# Patient Record
Sex: FEMALE | Race: WHITE
[De-identification: names, ages, dates, MRNs, and addresses within clinical notes are randomized per-mention and may not be internally consistent; named-entity substitution may affect disease eponyms.]

---

## 2022-05-16 ENCOUNTER — HOSPITAL ENCOUNTER (INPATIENT)
Dept: HOSPITAL 95 - SURS | Age: 67
LOS: 3 days | Discharge: HOME | DRG: 871 | End: 2022-05-19
Attending: HOSPITALIST | Admitting: HOSPITALIST
Payer: MEDICARE

## 2022-05-16 VITALS — HEIGHT: 61 IN | BODY MASS INDEX: 29.22 KG/M2 | WEIGHT: 154.76 LBS

## 2022-05-16 DIAGNOSIS — A41.9: Primary | ICD-10-CM

## 2022-05-16 DIAGNOSIS — Z87.891: ICD-10-CM

## 2022-05-16 DIAGNOSIS — J69.0: ICD-10-CM

## 2022-05-16 DIAGNOSIS — F15.10: ICD-10-CM

## 2022-05-16 DIAGNOSIS — I48.0: ICD-10-CM

## 2022-05-16 DIAGNOSIS — J96.01: ICD-10-CM

## 2022-05-16 DIAGNOSIS — Z90.49: ICD-10-CM

## 2022-05-16 DIAGNOSIS — J18.9: ICD-10-CM

## 2022-05-16 DIAGNOSIS — R65.20: ICD-10-CM

## 2022-05-16 DIAGNOSIS — E87.6: ICD-10-CM

## 2022-05-16 DIAGNOSIS — K56.609: ICD-10-CM

## 2022-05-16 LAB
ANION GAP SERPL CALCULATED.4IONS-SCNC: 8 MMOL/L (ref 6–16)
BUN SERPL-MCNC: 18 MG/DL (ref 8–24)
CALCIUM SERPL-MCNC: 8.8 MG/DL (ref 8.5–10.1)
CHLORIDE SERPL-SCNC: 105 MMOL/L (ref 98–108)
CO2 SERPL-SCNC: 28 MMOL/L (ref 21–32)
CREAT SERPL-MCNC: 0.48 MG/DL (ref 0.4–1)
GLUCOSE SERPL-MCNC: 95 MG/DL (ref 70–99)
MAGNESIUM SERPL-MCNC: 2.2 MG/DL (ref 1.6–2.4)
POTASSIUM SERPL-SCNC: 3.4 MMOL/L (ref 3.5–5.5)
SODIUM SERPL-SCNC: 141 MMOL/L (ref 136–145)

## 2022-05-17 LAB
ANION GAP SERPL CALCULATED.4IONS-SCNC: 9 MMOL/L (ref 6–16)
BUN SERPL-MCNC: 18 MG/DL (ref 8–24)
CALCIUM SERPL-MCNC: 8.2 MG/DL (ref 8.5–10.1)
CHLORIDE SERPL-SCNC: 106 MMOL/L (ref 98–108)
CO2 SERPL-SCNC: 29 MMOL/L (ref 21–32)
CREAT SERPL-MCNC: 0.5 MG/DL (ref 0.4–1)
DEPRECATED RDW RBC AUTO: 50.6 FL (ref 35.1–46.3)
ERYTHROCYTE [DISTWIDTH] IN BLOOD BY AUTOMATED COUNT: 15.6 % (ref 11.7–14.2)
GLUCOSE SERPL-MCNC: 92 MG/DL (ref 70–99)
HCT VFR BLD AUTO: 39.8 % (ref 33–51)
HGB BLD-MCNC: 13.2 G/DL (ref 11.5–16)
MCHC RBC AUTO-ENTMCNC: 33.2 G/DL (ref 31.5–36.5)
MCV RBC AUTO: 88 FL (ref 80–100)
NRBC # BLD AUTO: 0 K/MM3 (ref 0–0.02)
NRBC BLD AUTO-RTO: 0 /100 WBC (ref 0–0.2)
PLATELET # BLD AUTO: 167 K/MM3 (ref 150–400)
POTASSIUM SERPL-SCNC: 3.1 MMOL/L (ref 3.5–5.5)
SODIUM SERPL-SCNC: 144 MMOL/L (ref 136–145)

## 2022-05-17 NOTE — NUR
SHIFT SUMMARY
PT TO HAVE SMALL BOWEL FOLLOW-THROUGH IN IMAGING TODAY. PT INITIALLY PASSING
FLATUS AND WAS THEN ABLE TO HAVE A BM W/ 2200ML LIQUID AS WELL AS A FORMED,
DENSE STOOL. SINCE, PT HAS HAD MULTIPLE LIQUID STOOLS. PT TOOK A SHOWER TODAY
AND HAS BEEN ADVANCED TO SIPS OF CLEAR LIQUIDS W/ PERMISSION FOR POPSICLES AND
HAS TOLERATED THE DIET WELL. AT APPROX 1715, PT EXPERIENCED A RUN OF VTACH FOR
8 BEATS. FOR FURTHER, SEE TELEMETRY REPORT. PT IS RESTING IN HER ROOM AT TIME
TIME. WILL CONTINUE TO MONITOR AND UPDATE AS NECESSARY.

## 2022-05-17 NOTE — NUR
NG TUBE DCed
PT'S NG TUBE WAS DCED. NO DISCOMFORT ON DC AND TUBE WAS FOUND TO BE INTACT. NO
GAGGING OR VOMITING UPON REMOVAL.

## 2022-05-17 NOTE — NUR
SHIFT SUMMARY
A/O X4. SINCE ARRIVAL PATIENT HAS REMAINED ON 7L HIGH FLOW 02 W/ NC. NG TUBE
IN PLACE TO LIS DRAINING GREENISH BROWN FLUID, 600ML OUT THIS SHIFT. NAVAS
DRAINING TO GRAVITY. PT REPORTS NO PAIN THROUGHOUT SHIFT. VITAL SIGNS STABLE.
TELE IN PLACE. WILL CONTINUE TO MONITOR AND REPORT TO ONCOMING RN.

## 2022-05-18 LAB
ALBUMIN SERPL BCP-MCNC: 2.5 G/DL (ref 3.4–5)
ALBUMIN/GLOB SERPL: 0.7 {RATIO} (ref 0.8–1.8)
ALT SERPL W P-5'-P-CCNC: 47 U/L (ref 12–78)
ANION GAP SERPL CALCULATED.4IONS-SCNC: 6 MMOL/L (ref 6–16)
AST SERPL W P-5'-P-CCNC: 59 U/L (ref 12–37)
BASOPHILS # BLD AUTO: 0.07 K/MM3 (ref 0–0.23)
BASOPHILS NFR BLD AUTO: 1 % (ref 0–2)
BILIRUB SERPL-MCNC: 0.8 MG/DL (ref 0.1–1)
BUN SERPL-MCNC: 15 MG/DL (ref 8–24)
CALCIUM SERPL-MCNC: 8.4 MG/DL (ref 8.5–10.1)
CHLORIDE SERPL-SCNC: 112 MMOL/L (ref 98–108)
CO2 SERPL-SCNC: 24 MMOL/L (ref 21–32)
CREAT SERPL-MCNC: 0.45 MG/DL (ref 0.4–1)
DEPRECATED RDW RBC AUTO: 51.8 FL (ref 35.1–46.3)
EOSINOPHIL # BLD AUTO: 0.43 K/MM3 (ref 0–0.68)
EOSINOPHIL NFR BLD AUTO: 3 % (ref 0–6)
ERYTHROCYTE [DISTWIDTH] IN BLOOD BY AUTOMATED COUNT: 15.6 % (ref 11.7–14.2)
GLOBULIN SER CALC-MCNC: 3.5 G/DL (ref 2.2–4)
GLUCOSE SERPL-MCNC: 103 MG/DL (ref 70–99)
HCT VFR BLD AUTO: 39.7 % (ref 33–51)
HGB BLD-MCNC: 13 G/DL (ref 11.5–16)
IMM GRANULOCYTES # BLD AUTO: 0.13 K/MM3 (ref 0–0.1)
IMM GRANULOCYTES NFR BLD AUTO: 1 % (ref 0–1)
LYMPHOCYTES # BLD AUTO: 2.36 K/MM3 (ref 0.84–5.2)
LYMPHOCYTES NFR BLD AUTO: 19 % (ref 21–46)
MCHC RBC AUTO-ENTMCNC: 32.7 G/DL (ref 31.5–36.5)
MCV RBC AUTO: 90 FL (ref 80–100)
MONOCYTES # BLD AUTO: 0.92 K/MM3 (ref 0.16–1.47)
MONOCYTES NFR BLD AUTO: 7 % (ref 4–13)
NEUTROPHILS # BLD AUTO: 8.62 K/MM3 (ref 1.96–9.15)
NEUTROPHILS NFR BLD AUTO: 69 % (ref 41–73)
NRBC # BLD AUTO: 0 K/MM3 (ref 0–0.02)
NRBC BLD AUTO-RTO: 0 /100 WBC (ref 0–0.2)
PLATELET # BLD AUTO: 266 K/MM3 (ref 150–400)
POTASSIUM SERPL-SCNC: 3.2 MMOL/L (ref 3.5–5.5)
PROT SERPL-MCNC: 6 G/DL (ref 6.4–8.2)
SODIUM SERPL-SCNC: 142 MMOL/L (ref 136–145)

## 2022-05-18 NOTE — NUR
PT HAD MULTIPLE RUST COLORED LIQ STOOLS. PT DENIED PAIN/N/V. ABD SOFT TO PALP,
ANASTACIO CL PO. SATS >90% ON 7L HFNC. PT REMAINS SOB W/EXERTION, LUNGS W/CRACKLES
IN BASES.  I/S USE REINFORCED. PT UP TO BSC INDEP. NAVAS DRNG BLADIMIR URINE.

## 2022-05-18 NOTE — NUR
AT APPROX 2100 WAS ALERTED BY [employer] THAT PT HAD A RUN OF 7 T-TACH. PT IS
A&O AND VS ARE STABLE.

## 2022-05-18 NOTE — NUR
SHIFT SUMMARY:
SBO
NO SIGNIFICANT CHANGES. PATIENT IS A&OX4. PATIENT IS NOW ON 2L NC ON OXYGEN
WITH >90% OF OXYGEN. PATIENT DENIES PAIN THROUGHOUT SHIFT. SHE IS INDEP. IN
THE ROOM TO BATHROOM. SHE IS VOIDING AND HAVING BMS. SHE IS TOLERATING HER
FULL LIQUID TRAY AND WILL BE TRYING HER REGULAR TRAY FOR DINNER.
CALLS APPROPRIATELY. CALL LIGHT WITHIN REACH.

## 2022-05-19 LAB
ANION GAP SERPL CALCULATED.4IONS-SCNC: 8 MMOL/L (ref 6–16)
BASOPHILS # BLD AUTO: 0.06 K/MM3 (ref 0–0.23)
BASOPHILS NFR BLD AUTO: 1 % (ref 0–2)
BUN SERPL-MCNC: 8 MG/DL (ref 8–24)
CALCIUM SERPL-MCNC: 8.3 MG/DL (ref 8.5–10.1)
CHLORIDE SERPL-SCNC: 108 MMOL/L (ref 98–108)
CO2 SERPL-SCNC: 25 MMOL/L (ref 21–32)
CREAT SERPL-MCNC: 0.46 MG/DL (ref 0.4–1)
DEPRECATED RDW RBC AUTO: 50.3 FL (ref 35.1–46.3)
EOSINOPHIL # BLD AUTO: 0.38 K/MM3 (ref 0–0.68)
EOSINOPHIL NFR BLD AUTO: 3 % (ref 0–6)
ERYTHROCYTE [DISTWIDTH] IN BLOOD BY AUTOMATED COUNT: 15.4 % (ref 11.7–14.2)
GLUCOSE SERPL-MCNC: 105 MG/DL (ref 70–99)
HCT VFR BLD AUTO: 36.3 % (ref 33–51)
HGB BLD-MCNC: 12.1 G/DL (ref 11.5–16)
IMM GRANULOCYTES # BLD AUTO: 0.14 K/MM3 (ref 0–0.1)
IMM GRANULOCYTES NFR BLD AUTO: 1 % (ref 0–1)
LYMPHOCYTES # BLD AUTO: 2.25 K/MM3 (ref 0.84–5.2)
LYMPHOCYTES NFR BLD AUTO: 20 % (ref 21–46)
MAGNESIUM SERPL-MCNC: 1.7 MG/DL (ref 1.6–2.4)
MCHC RBC AUTO-ENTMCNC: 33.3 G/DL (ref 31.5–36.5)
MCV RBC AUTO: 88 FL (ref 80–100)
MONOCYTES # BLD AUTO: 1.02 K/MM3 (ref 0.16–1.47)
MONOCYTES NFR BLD AUTO: 9 % (ref 4–13)
NEUTROPHILS # BLD AUTO: 7.53 K/MM3 (ref 1.96–9.15)
NEUTROPHILS NFR BLD AUTO: 66 % (ref 41–73)
NRBC # BLD AUTO: 0 K/MM3 (ref 0–0.02)
NRBC BLD AUTO-RTO: 0 /100 WBC (ref 0–0.2)
PHOSPHATE SERPL-MCNC: 1.7 MG/DL (ref 2.5–4.9)
PLATELET # BLD AUTO: 211 K/MM3 (ref 150–400)
POTASSIUM SERPL-SCNC: 3.5 MMOL/L (ref 3.5–5.5)
SODIUM SERPL-SCNC: 141 MMOL/L (ref 136–145)

## 2022-05-19 NOTE — NUR
DISCHARGE
PT PROVIDED WITH WRITTEN AND VERBAL DISCHARGE INSTRUCTIONS; SHE REPORTED
UNDERSTANDING.  PT WAS EDUCATED TO LEAVE DRESSING IN PLACE OVER CENTRAL LINE
REMOVAL SITE FOR APPROXIMATELY 48 HOURS AND TO REPLACE WITH A BANDAID IF THE
DRESSING COMES OFF BEFORE THAT TIME.  PT EDUCATED TO REACH OUT TO PRIMARY CARE
OFFICES IN HER AREA AND TRY TO ESTABLISH A PCP AND APPOINTMENT WITHIN 1 WEEK.
PT REMAINED ON RA T/O THE DAY, O2 SATURATIONS MAINTAINED IN THE MID TO HIGH
90'S.  PT WORKED WITH THERAPY AND NO FOLLOW UP PT WAS RECOMMENDED FOR HOME. PT
ESCORTED OUT IN WHEELCHAIR AT APPROXIMATELY 1712.

## 2022-05-19 NOTE — NUR
PT IS A&OX4, INDEPENDENT TO BSC, AND IS ABLE TO MAKE NEEDS KNOWN. PT ABLE TO
SLEEP 7+ HOURS THIS SHIFT. NO COMPLAINTS OF PAIN. HAVING LOOSE STOOLS.
TOLERATING REGULAR DIET. REMAINS ON TELE FOR A-FIB, HAD A RUN OF V-TACH LAST
EVENING PT STATED THAT THEY FELT FINE AND VS WERE STABLE. WILL CONTINUE TO
MONITOR THIS PT AND GIVE REPORT TO ONCOMING NURSE.